# Patient Record
Sex: MALE | Race: WHITE | NOT HISPANIC OR LATINO | URBAN - NONMETROPOLITAN AREA
[De-identification: names, ages, dates, MRNs, and addresses within clinical notes are randomized per-mention and may not be internally consistent; named-entity substitution may affect disease eponyms.]

---

## 2022-04-25 RX ORDER — SERTRALINE HYDROCHLORIDE 100 MG/1
TABLET, FILM COATED ORAL
Qty: 90 TABLET | Refills: 0 | Status: SHIPPED | OUTPATIENT
Start: 2022-04-25 | End: 2022-05-25 | Stop reason: SDUPTHER

## 2022-04-25 NOTE — TELEPHONE ENCOUNTER
Rx Refill Note    Requested Prescriptions     Pending Prescriptions Disp Refills   • sertraline (ZOLOFT) 100 MG tablet [Pharmacy Med Name: SERTRALINE  MG TABLET] 90 tablet 0     Sig: TAKE ONE TABLET BY MOUTH DAILY        Last office visit with prescribing clinician: [facility] 10/29/21     Next office visit with prescribing clinician: Visit date not found   Last labs:   Last refill: 10/22/21   Pharmacy (be sure to add in Epic). correct

## 2022-05-12 ENCOUNTER — OFFICE VISIT (OUTPATIENT)
Dept: FAMILY MEDICINE CLINIC | Facility: CLINIC | Age: 52
End: 2022-05-12

## 2022-05-12 VITALS
OXYGEN SATURATION: 99 % | HEIGHT: 71 IN | RESPIRATION RATE: 15 BRPM | SYSTOLIC BLOOD PRESSURE: 118 MMHG | BODY MASS INDEX: 25.1 KG/M2 | WEIGHT: 179.3 LBS | DIASTOLIC BLOOD PRESSURE: 78 MMHG | TEMPERATURE: 97.8 F | HEART RATE: 78 BPM

## 2022-05-12 DIAGNOSIS — Z13.220 SCREENING FOR HYPERLIPIDEMIA: ICD-10-CM

## 2022-05-12 DIAGNOSIS — E78.00 PURE HYPERCHOLESTEROLEMIA: ICD-10-CM

## 2022-05-12 DIAGNOSIS — Z11.59 NEED FOR HEPATITIS C SCREENING TEST: ICD-10-CM

## 2022-05-12 DIAGNOSIS — Z13.1 SCREENING FOR DIABETES MELLITUS: ICD-10-CM

## 2022-05-12 DIAGNOSIS — I10 ESSENTIAL HYPERTENSION: ICD-10-CM

## 2022-05-12 DIAGNOSIS — R23.0 BLUISH SKIN DISCOLORATION: Primary | ICD-10-CM

## 2022-05-12 DIAGNOSIS — Z79.899 HIGH RISK MEDICATION USE: ICD-10-CM

## 2022-05-12 PROBLEM — F41.9 ANXIETY: Status: ACTIVE | Noted: 2022-05-12

## 2022-05-12 PROBLEM — E66.3 OVERWEIGHT WITH BODY MASS INDEX (BMI) 25.0-29.9: Status: ACTIVE | Noted: 2019-08-27

## 2022-05-12 PROBLEM — K21.9 GERD WITHOUT ESOPHAGITIS: Status: ACTIVE | Noted: 2022-05-12

## 2022-05-12 PROCEDURE — 99204 OFFICE O/P NEW MOD 45 MIN: CPT | Performed by: PHYSICIAN ASSISTANT

## 2022-05-12 RX ORDER — TESTOSTERONE CYPIONATE 200 MG/ML
INJECTION, SOLUTION INTRAMUSCULAR
COMMUNITY

## 2022-05-12 RX ORDER — RAMIPRIL 10 MG/1
CAPSULE ORAL
COMMUNITY
End: 2022-05-31

## 2022-05-12 RX ORDER — OMEPRAZOLE 40 MG/1
CAPSULE, DELAYED RELEASE ORAL
COMMUNITY
Start: 2022-02-24

## 2022-05-12 NOTE — ASSESSMENT & PLAN NOTE
Hypertension is unchanged.  Continue current treatment regimen.  Blood pressure will be reassessed in 2 weeks.

## 2022-05-12 NOTE — PROGRESS NOTES
Patient Office Visit      Patient Name: Harvey Wu  : 1970   MRN: [id number]     Chief Complaint:    Chief Complaint   Patient presents with   • Skin Discoloration     Left knee x 5 days.       History of Present Illness: Harvey Wu is a 51 y.o. male who is here today with concerns of a purplish discoloration all around the left anterior knee area.  The discoloration was much worse this morning when he woke up.  He does not know if he has a bruise.  He cannot recall any type of injury.  Area does not hurt at all.  He admits to drinking about 3 beers a night.  He denies any history of liver enzyme elevation.    Subjective      Review of Systems:   Review of Systems   Constitutional: Negative for chills and fever.   Gastrointestinal: Negative for abdominal pain.   Musculoskeletal: Negative for arthralgias, joint swelling and myalgias.   Skin: Positive for color change. Negative for rash.        Past Medical History:   Past Medical History:   Diagnosis Date   • Boxer's fracture     right hand - did not heal right with casting so had surgery to repair   • Generalized anxiety disorder    • Low testosterone     testosterone shots       Past Surgical History:   Past Surgical History:   Procedure Laterality Date   • HAND SURGERY      Boxer's fracture repair   • INGUINAL HERNIA REPAIR Right 1972       Family History:   Family History   Problem Relation Age of Onset   • Pneumonia Father         cause of death   • Other Father         heavy smoker   • Cancer Brother 50        throat cancer       Social History:   Social History     Socioeconomic History   • Marital status:    Tobacco Use   • Smoking status: Current Some Day Smoker     Packs/day: 0.25     Years: 10.00     Pack years: 2.50   • Smokeless tobacco: Never Used   Vaping Use   • Vaping Use: Never used   Substance and Sexual Activity   • Alcohol use: Defer   • Drug use: Defer   • Sexual activity: Defer       Allergies:   Allergies  "  Allergen Reactions   • Codeine Itching       Objective     Physical Exam:  Vital Signs:   Vitals:    05/12/22 1436   BP: 118/78   BP Location: Right arm   Patient Position: Sitting   Cuff Size: Adult   Pulse: 78   Resp: 15   Temp: 97.8 °F (36.6 °C)   TempSrc: Temporal   SpO2: 99%   Weight: 81.3 kg (179 lb 4.8 oz)   Height: 180.3 cm (71\")     Body mass index is 25.01 kg/m².          Physical Exam  Skin:            Comments: Patient's overall complexion is kendal and dark.  There is a bluish purplish discoloration over the entire anterior left knee area.         Procedures    Assessment / Plan      Assessment/Plan:   Diagnoses and all orders for this visit:    1. Bluish skin discoloration (Primary)  -     ARLEEN Direct Reflex to 11 Biomarker; Future  -     C-reactive protein; Future    2. Pure hypercholesterolemia  -     Lipid Panel; Future    3. Essential hypertension  Assessment & Plan:  Hypertension is unchanged.  Continue current treatment regimen.  Blood pressure will be reassessed in 2 weeks.      4. High risk medication use  -     Cancel: CK; Future  -     Cancel: CBC & Differential; Future  -     Cancel: Comprehensive Metabolic Panel; Future  -     Cancel: ARLEEN Direct Reflex to 11 Biomarker  -     Cancel: C-reactive protein  -     CK; Future  -     CBC & Differential; Future  -     Comprehensive Metabolic Panel; Future    5. Need for hepatitis C screening test  -     Cancel: Hepatitis C Antibody  -     Hepatitis C Antibody; Future    6. Screening for hyperlipidemia  -     Cancel: Lipid Panel; Future  -     Lipid Panel; Future    7. Screening for diabetes mellitus  -     Cancel: Hemoglobin A1c; Future  -     Hemoglobin A1c; Future     Patient was also examined by Dr. Bran.  He was unsure of the cause of this bluish purplish skin discoloration around the patient's left knee area.  This is not consistent with a bruise as the area is not tender and has not changed colors over the past 5 days.  Patient has a dark " complexion in general and has some reddish discolored areas around his anterior chest but this appears to be something different.  He has a history of melasma on his face but this has resolved.  Dr. Bran did not think that the discoloration around his knee is melasma.  Patient does have a history of low testosterone treated with testosterone replacement managed by urology.  Urology monitors his testosterone levels and his PSA levels.  Patient is going to return for fasting blood work in the next few days and follow-up with Dr. Bran in 2 weeks.    Medications:     Current Outpatient Medications:   •  omeprazole (priLOSEC) 40 MG capsule, , Disp: , Rfl:   •  ramipril (ALTACE) 10 MG capsule, ramipril 10 mg capsule  Take 1 capsule every day by oral route., Disp: , Rfl:   •  sertraline (ZOLOFT) 100 MG tablet, TAKE ONE TABLET BY MOUTH DAILY, Disp: 90 tablet, Rfl: 0  •  Testosterone Cypionate (DEPOTESTOTERONE CYPIONATE) 200 MG/ML injection, Depo-Testosterone 200 mg/mL intramuscular oil  1 cc ( 200 mg) IM Every week, Disp: , Rfl:         Follow Up:   Return in about 2 weeks (around 5/26/2022) for Recheck.    Arlin Moon PA-C   [facility] [place] [facility]

## 2022-05-18 ENCOUNTER — LAB (OUTPATIENT)
Dept: FAMILY MEDICINE CLINIC | Facility: CLINIC | Age: 52
End: 2022-05-18

## 2022-05-18 DIAGNOSIS — R23.0 BLUISH SKIN DISCOLORATION: ICD-10-CM

## 2022-05-18 DIAGNOSIS — E78.00 PURE HYPERCHOLESTEROLEMIA: ICD-10-CM

## 2022-05-18 DIAGNOSIS — Z13.1 SCREENING FOR DIABETES MELLITUS: ICD-10-CM

## 2022-05-18 DIAGNOSIS — Z11.59 NEED FOR HEPATITIS C SCREENING TEST: ICD-10-CM

## 2022-05-18 DIAGNOSIS — Z79.899 HIGH RISK MEDICATION USE: ICD-10-CM

## 2022-05-18 DIAGNOSIS — Z13.220 SCREENING FOR HYPERLIPIDEMIA: ICD-10-CM

## 2022-05-18 PROCEDURE — 36415 COLL VENOUS BLD VENIPUNCTURE: CPT | Performed by: PHYSICIAN ASSISTANT

## 2022-05-19 LAB
ALBUMIN SERPL-MCNC: 4.9 G/DL (ref 3.8–4.9)
ALBUMIN/GLOB SERPL: 2 {RATIO} (ref 1.2–2.2)
ALP SERPL-CCNC: 47 IU/L (ref 44–121)
ALT SERPL-CCNC: 16 IU/L (ref 0–44)
ANA SER QL: NEGATIVE
AST SERPL-CCNC: 33 IU/L (ref 0–40)
BASOPHILS # BLD AUTO: 0.1 X10E3/UL (ref 0–0.2)
BASOPHILS NFR BLD AUTO: 2 %
BILIRUB SERPL-MCNC: 0.5 MG/DL (ref 0–1.2)
BUN SERPL-MCNC: 7 MG/DL (ref 6–24)
BUN/CREAT SERPL: 7 (ref 9–20)
CALCIUM SERPL-MCNC: 9.5 MG/DL (ref 8.7–10.2)
CHLORIDE SERPL-SCNC: 100 MMOL/L (ref 96–106)
CHOLEST SERPL-MCNC: 242 MG/DL (ref 100–199)
CK SERPL-CCNC: 233 U/L (ref 41–331)
CO2 SERPL-SCNC: 20 MMOL/L (ref 20–29)
CREAT SERPL-MCNC: 0.99 MG/DL (ref 0.76–1.27)
CRP SERPL-MCNC: <1 MG/L (ref 0–10)
EGFRCR SERPLBLD CKD-EPI 2021: 91 ML/MIN/1.73
EOSINOPHIL # BLD AUTO: 0 X10E3/UL (ref 0–0.4)
EOSINOPHIL NFR BLD AUTO: 1 %
ERYTHROCYTE [DISTWIDTH] IN BLOOD BY AUTOMATED COUNT: 13 % (ref 11.6–15.4)
GLOBULIN SER CALC-MCNC: 2.5 G/DL (ref 1.5–4.5)
GLUCOSE SERPL-MCNC: 86 MG/DL (ref 65–99)
HBA1C MFR BLD: 5.2 % (ref 4.8–5.6)
HCT VFR BLD AUTO: 52.1 % (ref 37.5–51)
HCV AB S/CO SERPL IA: <0.1 S/CO RATIO (ref 0–0.9)
HDLC SERPL-MCNC: 95 MG/DL
HGB BLD-MCNC: 17.9 G/DL (ref 13–17.7)
IMM GRANULOCYTES # BLD AUTO: 0 X10E3/UL (ref 0–0.1)
IMM GRANULOCYTES NFR BLD AUTO: 1 %
LDLC SERPL CALC-MCNC: 123 MG/DL (ref 0–99)
LYMPHOCYTES # BLD AUTO: 1.3 X10E3/UL (ref 0.7–3.1)
LYMPHOCYTES NFR BLD AUTO: 22 %
MCH RBC QN AUTO: 32.3 PG (ref 26.6–33)
MCHC RBC AUTO-ENTMCNC: 34.4 G/DL (ref 31.5–35.7)
MCV RBC AUTO: 94 FL (ref 79–97)
MONOCYTES # BLD AUTO: 0.8 X10E3/UL (ref 0.1–0.9)
MONOCYTES NFR BLD AUTO: 12 %
NEUTROPHILS # BLD AUTO: 4 X10E3/UL (ref 1.4–7)
NEUTROPHILS NFR BLD AUTO: 62 %
PLATELET # BLD AUTO: 311 X10E3/UL (ref 150–450)
POTASSIUM SERPL-SCNC: 4.7 MMOL/L (ref 3.5–5.2)
PROT SERPL-MCNC: 7.4 G/DL (ref 6–8.5)
RBC # BLD AUTO: 5.55 X10E6/UL (ref 4.14–5.8)
SODIUM SERPL-SCNC: 139 MMOL/L (ref 134–144)
TRIGL SERPL-MCNC: 143 MG/DL (ref 0–149)
VLDLC SERPL CALC-MCNC: 24 MG/DL (ref 5–40)
WBC # BLD AUTO: 6.2 X10E3/UL (ref 3.4–10.8)

## 2022-05-25 ENCOUNTER — OFFICE VISIT (OUTPATIENT)
Dept: FAMILY MEDICINE CLINIC | Facility: CLINIC | Age: 52
End: 2022-05-25

## 2022-05-25 VITALS
DIASTOLIC BLOOD PRESSURE: 89 MMHG | OXYGEN SATURATION: 99 % | BODY MASS INDEX: 25.07 KG/M2 | RESPIRATION RATE: 12 BRPM | HEART RATE: 77 BPM | TEMPERATURE: 98 F | HEIGHT: 71 IN | WEIGHT: 179.1 LBS | SYSTOLIC BLOOD PRESSURE: 140 MMHG

## 2022-05-25 DIAGNOSIS — Z12.11 SCREENING FOR COLON CANCER: ICD-10-CM

## 2022-05-25 DIAGNOSIS — D75.1 ERYTHROCYTOSIS: ICD-10-CM

## 2022-05-25 DIAGNOSIS — R79.89 LOW TESTOSTERONE: ICD-10-CM

## 2022-05-25 DIAGNOSIS — Z79.899 HIGH RISK MEDICATION USE: ICD-10-CM

## 2022-05-25 DIAGNOSIS — F41.9 ANXIETY: ICD-10-CM

## 2022-05-25 DIAGNOSIS — I10 ESSENTIAL HYPERTENSION: ICD-10-CM

## 2022-05-25 DIAGNOSIS — Z00.00 PHYSICAL EXAM: Primary | ICD-10-CM

## 2022-05-25 PROCEDURE — 90471 IMMUNIZATION ADMIN: CPT | Performed by: FAMILY MEDICINE

## 2022-05-25 PROCEDURE — 99396 PREV VISIT EST AGE 40-64: CPT | Performed by: FAMILY MEDICINE

## 2022-05-25 PROCEDURE — 90715 TDAP VACCINE 7 YRS/> IM: CPT | Performed by: FAMILY MEDICINE

## 2022-05-25 RX ORDER — SERTRALINE HYDROCHLORIDE 100 MG/1
TABLET, FILM COATED ORAL
Qty: 135 TABLET | Refills: 0 | Status: SHIPPED | OUTPATIENT
Start: 2022-05-25 | End: 2022-10-05

## 2022-05-25 NOTE — ASSESSMENT & PLAN NOTE
And under a lot of duress at work.  We will increase his sertraline to 150 mg p.o. daily.  Have him come back in 3 months for recheck.

## 2022-05-25 NOTE — PROGRESS NOTES
Patient Name: Harvey Wu  : 1970   MRN: [id number]     Chief Complaint:    Chief Complaint   Patient presents with   • Annual Exam     Pt here for yearly exam   • Anxiety   • Hypertension       History of Present Illness: Harvey Wu is a 51 y.o. male who is here today for their annual health maintenance and physical.           Review of Systems:   Review of Systems    Past Medical History, Social History, Family History and Care Team were all reviewed with patient and updated as appropriate.     Medications:     Current Outpatient Medications:   •  omeprazole (priLOSEC) 40 MG capsule, , Disp: , Rfl:   •  ramipril (ALTACE) 10 MG capsule, ramipril 10 mg capsule  Take 1 capsule every day by oral route., Disp: , Rfl:   •  sertraline (ZOLOFT) 100 MG tablet, Take 1 and 1/2 tablets once a day, Disp: 135 tablet, Rfl: 0  •  Testosterone Cypionate (DEPOTESTOTERONE CYPIONATE) 200 MG/ML injection, Depo-Testosterone 200 mg/mL intramuscular oil  1 cc ( 200 mg) IM Every week, Disp: , Rfl:     Allergies:   Allergies   Allergen Reactions   • Codeine Itching       Health Maintenance   Topic Date Due   • ZOSTER VACCINE (1 of 2) Never done   • COVID-19 Vaccine (3 - Booster for Moderna series) 2021   • Pneumococcal Vaccine 0-64 (1 - PCV) 2023 (Originally 1976)   • INFLUENZA VACCINE  2022   • LIPID PANEL  2023   • ANNUAL PHYSICAL  2023   • COLORECTAL CANCER SCREENING  2029   • TDAP/TD VACCINES (2 - Td or Tdap) 2032   • HEPATITIS C SCREENING  Completed        PHQ-2 Total Score:          Intimate partner violence: (Screen on initial visit, older adult with injury or evidence of neglect):  • Violence can be a problem in many people's lives, so I now ask every patient about trauma or abuse they may have experienced in a relationship.  • Stress/Safety - Do you feel safe in your relationship?  • Afraid/Abused - Have you ever been in a relationship where you were  "threatened, hurt, or afraid?  • Friend/Family - Are your friends aware you have been hurt?  • Emergency Plan - Do you have a safe place to go and the resources you need in an emergency?    Osteoporosis:   • Men: history of low trauma fracture, androgen deprivation therapy for prostate cancer, hypogonadism, primary hyperparathyroidism, intestinal disorders.       Physical Exam:  Vital Signs:   Vitals:    05/25/22 0845   BP: 140/89   BP Location: Left arm   Patient Position: Sitting   Cuff Size: Adult   Pulse: 77   Resp: 12   Temp: 98 °F (36.7 °C)   SpO2: 99%   Weight: 81.2 kg (179 lb 1.6 oz)   Height: 180.3 cm (71\")   PainSc: 0-No pain     Body mass index is 24.98 kg/m².     Physical Exam    Procedures      Assessment/Plan:   Diagnoses and all orders for this visit:    1. Physical exam (Primary)  Assessment & Plan:  Discussed Cologuard.      2. Screening for colon cancer  Assessment & Plan:  Discussed Cologuard.    Orders:  -     Cologuard - Stool, Per Rectum; Future    3. Essential hypertension  Assessment & Plan:  Discussed with patient to monitor their blood pressure and if systolic blood pressure goes above 140 or diastolic is above 90 to return to clinic.  Take medicines as directed, call for any problems, patient not having or any worrisome symptoms.          4. High risk medication use  Assessment & Plan:  Patient is on testosterone.  We will recheck CBC in 3 months.      5. Anxiety  Assessment & Plan:  And under a lot of duress at work.  We will increase his sertraline to 150 mg p.o. daily.  Have him come back in 3 months for recheck.      6. Erythrocytosis  Assessment & Plan:  Hemoglobin hematocrit are elevated.  I think it might be a side effect of testosterone.  We will recheck in 3 months.  If it continues to elevate, may him talk to the urologist.      7. Low testosterone  Assessment & Plan:  Patient sees urology.      Other orders  -     Tdap Vaccine Greater Than or Equal To 8yo IM  -     sertraline " (ZOLOFT) 100 MG tablet; Take 1 and 1/2 tablets once a day  Dispense: 135 tablet; Refill: 0             Follow Up:   No follow-ups on file.    Healthcare Maintenance:   Counseling provided on stress management.  Harvey Wu voices understanding and acceptance of this advice and will call back with any further questions or concerns. AVS with preventive healthcare tips printed for patient.     Masood Bran MD  [facility] [place] [facility] [place]

## 2022-05-25 NOTE — ASSESSMENT & PLAN NOTE
Hemoglobin hematocrit are elevated.  I think it might be a side effect of testosterone.  We will recheck in 3 months.  If it continues to elevate, may him talk to the urologist.

## 2022-05-31 RX ORDER — RAMIPRIL 10 MG/1
CAPSULE ORAL
Qty: 90 CAPSULE | Refills: 0 | Status: SHIPPED | OUTPATIENT
Start: 2022-05-31 | End: 2022-08-29

## 2022-05-31 NOTE — TELEPHONE ENCOUNTER
Rx Refill Note    Requested Prescriptions     Pending Prescriptions Disp Refills   • ramipril (ALTACE) 10 MG capsule [Pharmacy Med Name: RAMIPRIL 10 MG CAPSULE] 90 capsule      Sig: TAKE ONE CAPSULE BY MOUTH DAILY        Last office visit with prescribing clinician: 5/25/2022      Next office visit with prescribing clinician: 8/25/2022   Last labs: 10/22/2021  Last refill: 02/24/2022

## 2022-08-29 RX ORDER — RAMIPRIL 10 MG/1
CAPSULE ORAL
Qty: 30 CAPSULE | Refills: 0 | Status: SHIPPED | OUTPATIENT
Start: 2022-08-29 | End: 2022-09-27

## 2022-08-29 NOTE — TELEPHONE ENCOUNTER
Rx Refill Note    Requested Prescriptions     Pending Prescriptions Disp Refills   • ramipril (ALTACE) 10 MG capsule [Pharmacy Med Name: RAMIPRIL 10 MG CAPSULE] 30 capsule 0     Sig: TAKE ONE CAPSULE BY MOUTH DAILY        Last office visit with prescribing clinician: 5/25/2022      Next office visit with prescribing clinician: Visit date not found   Last labs:   Last refill: 05/31/2022   Pharmacy (be sure to add in Epic). correct              
two [facility]/Self

## 2022-09-27 RX ORDER — RAMIPRIL 10 MG/1
CAPSULE ORAL
Qty: 90 CAPSULE | Refills: 0 | Status: SHIPPED | OUTPATIENT
Start: 2022-09-27 | End: 2023-01-06

## 2022-09-27 NOTE — TELEPHONE ENCOUNTER
Rx Refill Note    Requested Prescriptions     Pending Prescriptions Disp Refills   • ramipril (ALTACE) 10 MG capsule [Pharmacy Med Name: RAMIPRIL 10 MG CAPSULE] 30 capsule 0     Sig: TAKE ONE CAPSULE BY MOUTH DAILY        Last office visit with prescribing clinician: 5/25/2022      Next office visit with prescribing clinician: none  Last labs: 05/25/2022  Last refill:  08/29/2022  Pharmacy  [facility]

## 2022-10-05 RX ORDER — SERTRALINE HYDROCHLORIDE 100 MG/1
TABLET, FILM COATED ORAL
Qty: 45 TABLET | Refills: 0 | Status: SHIPPED | OUTPATIENT
Start: 2022-10-05 | End: 2022-11-14 | Stop reason: SDUPTHER

## 2022-10-05 NOTE — TELEPHONE ENCOUNTER
Rx Refill Note    Requested Prescriptions     Pending Prescriptions Disp Refills   • sertraline (ZOLOFT) 100 MG tablet [Pharmacy Med Name: SERTRALINE  MG TABLET] 135 tablet 0     Sig: TAKE 1 AND 1/2 TABLET BY MOUTH DAILY        Last office visit with prescribing clinician: 5/25/2022      Next office visit with prescribing clinician: none  Last labs: 05/25/2022  Last refill:  05/25/2022  Pharmacy  [facility]

## 2022-11-14 RX ORDER — SERTRALINE HYDROCHLORIDE 100 MG/1
TABLET, FILM COATED ORAL
Qty: 135 TABLET | Refills: 1 | Status: SHIPPED | OUTPATIENT
Start: 2022-11-14

## 2023-01-04 NOTE — TELEPHONE ENCOUNTER
Rx Refill Note    Requested Prescriptions     Pending Prescriptions Disp Refills   • ramipril (ALTACE) 10 MG capsule [Pharmacy Med Name: RAMIPRIL 10 MG CAPSULE] 90 capsule 0     Sig: TAKE ONE CAPSULE BY MOUTH DAILY        Last office visit with prescribing clinician: 5/25/2022      Next office visit with prescribing clinician: Visit date not found   Last labs:   Last refill: 09/27/2022   Pharmacy (be sure to add in Epic). correct

## 2023-01-06 RX ORDER — RAMIPRIL 10 MG/1
CAPSULE ORAL
Qty: 90 CAPSULE | Refills: 0 | Status: SHIPPED | OUTPATIENT
Start: 2023-01-06

## 2023-04-17 RX ORDER — NEEDLES, DISPOSABLE 25GX5/8"
NEEDLE, DISPOSABLE MISCELLANEOUS
COMMUNITY
Start: 2023-03-10

## 2023-04-17 RX ORDER — RAMIPRIL 10 MG/1
CAPSULE ORAL
Qty: 30 CAPSULE | Refills: 0 | Status: SHIPPED | OUTPATIENT
Start: 2023-04-17

## 2023-05-16 RX ORDER — RAMIPRIL 10 MG/1
CAPSULE ORAL
Qty: 30 CAPSULE | Refills: 0 | Status: SHIPPED | OUTPATIENT
Start: 2023-05-16

## 2023-05-16 NOTE — TELEPHONE ENCOUNTER
Rx Refill Note    Requested Prescriptions     Pending Prescriptions Disp Refills   • ramipril (ALTACE) 10 MG capsule [Pharmacy Med Name: RAMIPRIL 10 MG CAPSULE] 30 capsule 0     Sig: TAKE ONE CAPSULE BY MOUTH DAILY        Last office visit with prescribing clinician: 5/25/2022      Next office visit with prescribing clinician: Visit date not found   Last labs:   Last refill: 04/17/2023   Pharmacy (be sure to add in Epic). correct

## 2023-05-25 RX ORDER — SERTRALINE HYDROCHLORIDE 100 MG/1
TABLET, FILM COATED ORAL
Qty: 45 TABLET | Refills: 0 | Status: SHIPPED | OUTPATIENT
Start: 2023-05-25

## 2023-05-25 NOTE — TELEPHONE ENCOUNTER
Rx Refill Note    Requested Prescriptions     Pending Prescriptions Disp Refills   • sertraline (ZOLOFT) 100 MG tablet [Pharmacy Med Name: SERTRALINE  MG TABLET] 135 tablet 0     Sig: TAKE 1 AND 1/2 TABLET BY MOUTH DAILY        Last office visit with prescribing clinician: 5/25/2022      Next office visit with prescribing clinician: Visit date not found   Last labs:   Last refill: 11/14/2022   Pharmacy (be sure to add in Epic). correct

## 2023-06-15 RX ORDER — RAMIPRIL 10 MG/1
CAPSULE ORAL
Qty: 30 CAPSULE | Refills: 0 | OUTPATIENT
Start: 2023-06-15

## 2023-06-16 RX ORDER — OMEPRAZOLE 40 MG/1
CAPSULE, DELAYED RELEASE ORAL
Qty: 180 CAPSULE | OUTPATIENT
Start: 2023-06-16

## 2024-01-03 ENCOUNTER — OFFICE VISIT (OUTPATIENT)
Dept: FAMILY MEDICINE CLINIC | Facility: CLINIC | Age: 54
End: 2024-01-03
Payer: COMMERCIAL

## 2024-01-03 VITALS
HEART RATE: 68 BPM | RESPIRATION RATE: 16 BRPM | DIASTOLIC BLOOD PRESSURE: 78 MMHG | WEIGHT: 176.1 LBS | OXYGEN SATURATION: 98 % | HEIGHT: 71 IN | SYSTOLIC BLOOD PRESSURE: 128 MMHG | BODY MASS INDEX: 24.65 KG/M2

## 2024-01-03 DIAGNOSIS — F41.1 GENERALIZED ANXIETY DISORDER: ICD-10-CM

## 2024-01-03 DIAGNOSIS — R79.89 LOW TESTOSTERONE: ICD-10-CM

## 2024-01-03 DIAGNOSIS — Z23 NEED FOR VIRAL IMMUNIZATION: ICD-10-CM

## 2024-01-03 DIAGNOSIS — E03.9 ACQUIRED HYPOTHYROIDISM: ICD-10-CM

## 2024-01-03 DIAGNOSIS — I10 ESSENTIAL HYPERTENSION: Primary | ICD-10-CM

## 2024-01-03 DIAGNOSIS — Z12.5 SCREENING FOR PROSTATE CANCER: ICD-10-CM

## 2024-01-03 DIAGNOSIS — D75.1 ERYTHROCYTOSIS: ICD-10-CM

## 2024-01-03 PROCEDURE — 99214 OFFICE O/P EST MOD 30 MIN: CPT | Performed by: FAMILY MEDICINE

## 2024-01-03 NOTE — PROGRESS NOTES
Patient Name: Harvey Wu  : 1970   MRN: [id number]     Chief Complaint:    Chief Complaint   Patient presents with    Annual Exam       History of Present Illness: Harvey Wu is a 53 y.o. male who is here today for follow up.  HPI        Review of Systems:   Review of Systems   Constitutional: Negative.    HENT: Negative.     Eyes: Negative.    Respiratory: Negative.     Cardiovascular: Negative.    Gastrointestinal: Negative.    Neurological: Negative.         Past Medical History:   Past Medical History:   Diagnosis Date    Boxer's fracture     right hand - did not heal right with casting so had surgery to repair    Generalized anxiety disorder     Hypertension     Low testosterone     testosterone shots       Past Surgical History:   Past Surgical History:   Procedure Laterality Date    FRACTURE SURGERY      HAND SURGERY      Boxer's fracture repair    INGUINAL HERNIA REPAIR Right 1972       Family History:   Family History   Problem Relation Age of Onset    Pneumonia Father         cause of death    Other Father         heavy smoker    Cancer Brother 50        throat cancer       Social History:   Social History     Socioeconomic History    Marital status:    Tobacco Use    Smoking status: Every Day     Packs/day: 0.50     Years: 20.00     Additional pack years: 0.00     Total pack years: 10.00     Types: Cigarettes     Start date: 1986     Last attempt to quit: 2020     Years since quittin.0     Passive exposure: Current    Smokeless tobacco: Never   Vaping Use    Vaping Use: Never used   Substance and Sexual Activity    Alcohol use: Yes     Alcohol/week: 21.0 standard drinks of alcohol     Types: 21 Cans of beer per week    Drug use: Never    Sexual activity: Yes     Partners: Female       Medications:     Current Outpatient Medications:     omeprazole (priLOSEC) 40 MG capsule, Take 1 capsule by mouth Daily., Disp: 90 capsule, Rfl: 1    ramipril (ALTACE) 10 MG  "capsule, Take 1 capsule by mouth Daily., Disp: 90 capsule, Rfl: 1    sertraline (ZOLOFT) 100 MG tablet, Take 1.5 tablets by mouth Daily., Disp: 135 tablet, Rfl: 1    Testosterone Cypionate (DEPOTESTOTERONE CYPIONATE) 200 MG/ML injection, Depo-Testosterone 200 mg/mL intramuscular oil  1 cc ( 200 mg) IM Every week, Disp: , Rfl:     BD Hypodermic Needle 21G X 2\" misc, USE WEEKLY TO INJECT INSULIN, Disp: , Rfl:     Allergies:   Allergies   Allergen Reactions    Codeine Itching         Physical Exam:  Vital Signs:   Vitals:    01/03/24 1422   BP: 128/78   BP Location: Left arm   Patient Position: Sitting   Cuff Size: Adult   Pulse: 68   Resp: 16   SpO2: 98%   Weight: 79.9 kg (176 lb 1.6 oz)   Height: 180.3 cm (70.98\")   PainSc: 0-No pain     Body mass index is 24.57 kg/m².     Physical Exam  Vitals and nursing note reviewed.   Constitutional:       Appearance: Normal appearance. He is normal weight.   HENT:      Head: Normocephalic and atraumatic.      Right Ear: Tympanic membrane, ear canal and external ear normal.      Left Ear: Tympanic membrane, ear canal and external ear normal.      Nose: Nose normal.      Mouth/Throat:      Mouth: Mucous membranes are dry.      Pharynx: Oropharynx is clear.   Eyes:      Extraocular Movements: Extraocular movements intact.      Conjunctiva/sclera: Conjunctivae normal.      Pupils: Pupils are equal, round, and reactive to light.   Cardiovascular:      Rate and Rhythm: Normal rate and regular rhythm.      Pulses: Normal pulses.      Heart sounds: Normal heart sounds.   Pulmonary:      Effort: Pulmonary effort is normal.      Breath sounds: Normal breath sounds.   Musculoskeletal:      Cervical back: Normal range of motion and neck supple.   Feet:      Comments:      Neurological:      Mental Status: He is alert.         Procedures      Assessment/Plan:   Diagnoses and all orders for this visit:    1. Essential hypertension (Primary)  Assessment & Plan:  Discussed with patient to " monitor their blood pressure and if systolic blood pressure goes above 140 or diastolic is above 90 to return to clinic.  Take medicines as directed, call for any problems, patient not having or any worrisome symptoms.        Orders:  -     CBC & Differential; Future  -     Lipid Panel; Future  -     Comprehensive Metabolic Panel; Future  -     TSH Rfx On Abnormal To Free T4; Future  -     PSA Screen; Future    2. Acquired hypothyroidism  Assessment & Plan:  Blood work    Orders:  -     CBC & Differential; Future  -     Lipid Panel; Future  -     Comprehensive Metabolic Panel; Future  -     TSH Rfx On Abnormal To Free T4; Future  -     PSA Screen; Future    3. Generalized anxiety disorder  Assessment & Plan:  Continue medications.    Orders:  -     CBC & Differential; Future  -     Lipid Panel; Future  -     Comprehensive Metabolic Panel; Future  -     TSH Rfx On Abnormal To Free T4; Future  -     PSA Screen; Future    4. Erythrocytosis  Assessment & Plan:  Recheck Blood work today.    Orders:  -     CBC & Differential; Future  -     Lipid Panel; Future  -     Comprehensive Metabolic Panel; Future  -     TSH Rfx On Abnormal To Free T4; Future  -     PSA Screen; Future    5. Low testosterone  Assessment & Plan:  Patient has been seen Dr. Jc.    Orders:  -     CBC & Differential; Future  -     Lipid Panel; Future  -     Comprehensive Metabolic Panel; Future  -     TSH Rfx On Abnormal To Free T4; Future  -     PSA Screen; Future    6. Screening for prostate cancer  -     CBC & Differential; Future  -     Lipid Panel; Future  -     Comprehensive Metabolic Panel; Future  -     TSH Rfx On Abnormal To Free T4; Future  -     PSA Screen; Future             Follow Up:   Return in about 6 months (around 7/3/2024) for Annual physical, Bloodwork 1 week prior to next appointment.    Masood Bran MD  [facility]     Answers submitted by the patient for this visit:  Primary Reason for Visit  (Submitted on 1/3/2024)  What is the primary reason for your visit?: Other  Other (Submitted on 1/3/2024)  Please describe your symptoms.: Check up  Have you had these symptoms before?: No  How long have you been having these symptoms?: 1-4 days

## 2024-01-04 ENCOUNTER — TELEPHONE (OUTPATIENT)
Dept: FAMILY MEDICINE CLINIC | Facility: CLINIC | Age: 54
End: 2024-01-04
Payer: COMMERCIAL

## 2024-01-04 LAB
ALBUMIN SERPL-MCNC: 4.6 G/DL (ref 3.8–4.9)
ALBUMIN/GLOB SERPL: 1.9 {RATIO} (ref 1.2–2.2)
ALP SERPL-CCNC: 56 IU/L (ref 44–121)
ALT SERPL-CCNC: 18 IU/L (ref 0–44)
AST SERPL-CCNC: 32 IU/L (ref 0–40)
BASOPHILS # BLD AUTO: 0.1 X10E3/UL (ref 0–0.2)
BASOPHILS NFR BLD AUTO: 1 %
BILIRUB SERPL-MCNC: 0.7 MG/DL (ref 0–1.2)
BUN SERPL-MCNC: 7 MG/DL (ref 6–24)
BUN/CREAT SERPL: 6 (ref 9–20)
CALCIUM SERPL-MCNC: 9.6 MG/DL (ref 8.7–10.2)
CHLORIDE SERPL-SCNC: 102 MMOL/L (ref 96–106)
CHOLEST SERPL-MCNC: 206 MG/DL (ref 100–199)
CO2 SERPL-SCNC: 22 MMOL/L (ref 20–29)
CREAT SERPL-MCNC: 1.09 MG/DL (ref 0.76–1.27)
EGFRCR SERPLBLD CKD-EPI 2021: 80 ML/MIN/1.73
EOSINOPHIL # BLD AUTO: 0.1 X10E3/UL (ref 0–0.4)
EOSINOPHIL NFR BLD AUTO: 1 %
ERYTHROCYTE [DISTWIDTH] IN BLOOD BY AUTOMATED COUNT: 12 % (ref 11.6–15.4)
GLOBULIN SER CALC-MCNC: 2.4 G/DL (ref 1.5–4.5)
GLUCOSE SERPL-MCNC: 98 MG/DL (ref 70–99)
HCT VFR BLD AUTO: 51.2 % (ref 37.5–51)
HDLC SERPL-MCNC: 109 MG/DL
HGB BLD-MCNC: 17.8 G/DL (ref 13–17.7)
IMM GRANULOCYTES # BLD AUTO: 0 X10E3/UL (ref 0–0.1)
IMM GRANULOCYTES NFR BLD AUTO: 0 %
LDLC SERPL CALC-MCNC: 75 MG/DL (ref 0–99)
LYMPHOCYTES # BLD AUTO: 1.5 X10E3/UL (ref 0.7–3.1)
LYMPHOCYTES NFR BLD AUTO: 19 %
MCH RBC QN AUTO: 32.5 PG (ref 26.6–33)
MCHC RBC AUTO-ENTMCNC: 34.8 G/DL (ref 31.5–35.7)
MCV RBC AUTO: 93 FL (ref 79–97)
MONOCYTES # BLD AUTO: 1.1 X10E3/UL (ref 0.1–0.9)
MONOCYTES NFR BLD AUTO: 14 %
NEUTROPHILS # BLD AUTO: 5.1 X10E3/UL (ref 1.4–7)
NEUTROPHILS NFR BLD AUTO: 65 %
PLATELET # BLD AUTO: 274 X10E3/UL (ref 150–450)
POTASSIUM SERPL-SCNC: 4.5 MMOL/L (ref 3.5–5.2)
PROT SERPL-MCNC: 7 G/DL (ref 6–8.5)
PSA SERPL-MCNC: 1.2 NG/ML (ref 0–4)
RBC # BLD AUTO: 5.48 X10E6/UL (ref 4.14–5.8)
SODIUM SERPL-SCNC: 136 MMOL/L (ref 134–144)
TRIGL SERPL-MCNC: 135 MG/DL (ref 0–149)
TSH SERPL DL<=0.005 MIU/L-ACNC: 1.59 UIU/ML (ref 0.45–4.5)
VLDLC SERPL CALC-MCNC: 22 MG/DL (ref 5–40)
WBC # BLD AUTO: 7.8 X10E3/UL (ref 3.4–10.8)

## 2024-01-04 NOTE — TELEPHONE ENCOUNTER
Please get patient scheduled for a follow up on labs.     Abnormal test, Red blood cell count is elevated,  needs an appointment.

## 2024-01-08 ENCOUNTER — OFFICE VISIT (OUTPATIENT)
Dept: FAMILY MEDICINE CLINIC | Facility: CLINIC | Age: 54
End: 2024-01-08
Payer: COMMERCIAL

## 2024-01-08 VITALS
WEIGHT: 176.3 LBS | HEART RATE: 91 BPM | RESPIRATION RATE: 16 BRPM | DIASTOLIC BLOOD PRESSURE: 84 MMHG | BODY MASS INDEX: 24.68 KG/M2 | HEIGHT: 71 IN | OXYGEN SATURATION: 98 % | SYSTOLIC BLOOD PRESSURE: 130 MMHG

## 2024-01-08 DIAGNOSIS — I10 ESSENTIAL HYPERTENSION: ICD-10-CM

## 2024-01-08 DIAGNOSIS — E23.0 HYPOGONADOTROPIC HYPOGONADISM: ICD-10-CM

## 2024-01-08 DIAGNOSIS — Z87.891 PERSONAL HISTORY OF TOBACCO USE, PRESENTING HAZARDS TO HEALTH: ICD-10-CM

## 2024-01-08 DIAGNOSIS — D75.1 ERYTHROCYTOSIS: Primary | ICD-10-CM

## 2024-01-08 PROCEDURE — 99214 OFFICE O/P EST MOD 30 MIN: CPT | Performed by: FAMILY MEDICINE

## 2024-01-08 RX ORDER — VARENICLINE TARTRATE 0.5 (11)-1
KIT ORAL
Qty: 1 EACH | Refills: 0 | Status: SHIPPED | OUTPATIENT
Start: 2024-01-08 | End: 2024-02-05

## 2024-01-08 RX ORDER — VARENICLINE TARTRATE 1 MG/1
1 TABLET, FILM COATED ORAL 2 TIMES DAILY
Qty: 56 TABLET | Refills: 1 | Status: SHIPPED | OUTPATIENT
Start: 2024-02-05 | End: 2024-04-01

## 2024-01-08 NOTE — PROGRESS NOTES
"       Patient Name: Harvey Wu  : 1970   MRN: [id number]     Chief Complaint:    Chief Complaint   Patient presents with    Follow up Lab Work       History of Present Illness: Harvey Wu is a 53 y.o. male who is here today for follow up on blood count and testosterone replacement  HPI        Review of Systems:   Review of Systems   Constitutional: Negative.    HENT: Negative.     Eyes: Negative.    Respiratory: Negative.     Cardiovascular: Negative.    Gastrointestinal: Negative.    Neurological: Negative.         Past Medical History:   Past Medical History:   Diagnosis Date    Boxer's fracture     right hand - did not heal right with casting so had surgery to repair    Generalized anxiety disorder     Hypertension     Low testosterone     testosterone shots       Past Surgical History:   Past Surgical History:   Procedure Laterality Date    FRACTURE SURGERY      HAND SURGERY      Boxer's fracture repair    INGUINAL HERNIA REPAIR Right 1972       Family History:   Family History   Problem Relation Age of Onset    Pneumonia Father         cause of death    Other Father         heavy smoker    Cancer Brother 50        throat cancer       Social History:   Social History     Socioeconomic History    Marital status:    Tobacco Use    Smoking status: Former     Packs/day: 0.50     Years: 20.00     Additional pack years: 0.00     Total pack years: 10.00     Types: Cigarettes     Start date: 1986     Quit date: 2020     Years since quittin.0     Passive exposure: Current    Smokeless tobacco: Never   Vaping Use    Vaping Use: Never used   Substance and Sexual Activity    Alcohol use: Yes     Alcohol/week: 21.0 standard drinks of alcohol     Types: 21 Cans of beer per week    Drug use: Never    Sexual activity: Yes     Partners: Female       Medications:     Current Outpatient Medications:     BD Hypodermic Needle 21G X 2\" misc, USE WEEKLY TO INJECT INSULIN, Disp: , Rfl:     " "omeprazole (priLOSEC) 40 MG capsule, Take 1 capsule by mouth Daily., Disp: 90 capsule, Rfl: 1    ramipril (ALTACE) 10 MG capsule, Take 1 capsule by mouth Daily., Disp: 90 capsule, Rfl: 1    sertraline (ZOLOFT) 100 MG tablet, Take 1.5 tablets by mouth Daily., Disp: 135 tablet, Rfl: 1    Testosterone Cypionate (DEPOTESTOTERONE CYPIONATE) 200 MG/ML injection, Depo-Testosterone 200 mg/mL intramuscular oil  1 cc ( 200 mg) IM Every week, Disp: , Rfl:     [START ON 2/5/2024] varenicline (Chantix Continuing Month Brett) 1 MG tablet, Take 1 tablet by mouth 2 (Two) Times a Day for 56 days., Disp: 56 tablet, Rfl: 1    Varenicline Tartrate, Starter, 0.5 MG X 11 & 1 MG X 42 tablet therapy pack, Take 0.5 mg by mouth Daily for 3 days, THEN 0.5 mg 2 (Two) Times a Day for 4 days, THEN 1 mg 2 (Two) Times a Day for 21 days. Take 0.5 mg po daily x 3 days, then 0.5 mg po bid x 4 days, then 1 mg po bid, Disp: 1 each, Rfl: 0    Allergies:   Allergies   Allergen Reactions    Codeine Itching         Physical Exam:  Vital Signs:   Vitals:    01/08/24 1111   BP: 130/84   BP Location: Left arm   Patient Position: Sitting   Cuff Size: Adult   Pulse: 91   Resp: 16   SpO2: 98%   Weight: 80 kg (176 lb 4.8 oz)   Height: 180.3 cm (70.98\")   PainSc: 0-No pain     Body mass index is 24.6 kg/m².     Physical Exam  Vitals and nursing note reviewed.   Constitutional:       Appearance: Normal appearance. He is normal weight.   HENT:      Head: Normocephalic and atraumatic.      Right Ear: Tympanic membrane, ear canal and external ear normal.      Left Ear: Tympanic membrane, ear canal and external ear normal.      Nose: Nose normal.      Mouth/Throat:      Mouth: Mucous membranes are dry.      Pharynx: Oropharynx is clear.   Eyes:      Extraocular Movements: Extraocular movements intact.      Conjunctiva/sclera: Conjunctivae normal.      Pupils: Pupils are equal, round, and reactive to light.   Cardiovascular:      Rate and Rhythm: Normal rate and regular " rhythm.      Pulses: Normal pulses.      Heart sounds: Normal heart sounds.   Pulmonary:      Effort: Pulmonary effort is normal.      Breath sounds: Normal breath sounds.   Musculoskeletal:      Cervical back: Normal range of motion and neck supple.   Feet:      Comments:      Neurological:      Mental Status: He is alert.         Procedures      Assessment/Plan:   Diagnoses and all orders for this visit:    1. Erythrocytosis (Primary)  Assessment & Plan:  See urologist about decreasing testosterone.      2. Hypogonadotropic hypogonadism  Assessment & Plan:  Reviewed patient's Blood work.  He had an elevated hematocrit and hemoglobin.  They were greatly elevated but they are above normal.  Last we checked his testosterone it was over replaced.  I am going to have him see his urologist to see if they want to decrease the dose and monitor this.  Recheck in 6 months      3. Essential hypertension  Assessment & Plan:  Discussed with patient to monitor their blood pressure and if systolic blood pressure goes above 140 or diastolic is above 90 to return to clinic.  Take medicines as directed, call for any problems, patient not having or any worrisome symptoms.          4. Personal history of tobacco use, presenting hazards to health  Assessment & Plan:  Patient is wanting to use Chantix.  No contraindications.  Has tolerated well in the past.  Will call in a prescription.      Other orders  -     Varenicline Tartrate, Starter, 0.5 MG X 11 & 1 MG X 42 tablet therapy pack; Take 0.5 mg by mouth Daily for 3 days, THEN 0.5 mg 2 (Two) Times a Day for 4 days, THEN 1 mg 2 (Two) Times a Day for 21 days. Take 0.5 mg po daily x 3 days, then 0.5 mg po bid x 4 days, then 1 mg po bid  Dispense: 1 each; Refill: 0  -     varenicline (Chantix Continuing Month Brett) 1 MG tablet; Take 1 tablet by mouth 2 (Two) Times a Day for 56 days.  Dispense: 56 tablet; Refill: 1             Follow Up:   No follow-ups on file.    Masood Bran,  MD  [facility]

## 2024-01-08 NOTE — ASSESSMENT & PLAN NOTE
Patient is wanting to use Chantix.  No contraindications.  Has tolerated well in the past.  Will call in a prescription.

## 2024-01-08 NOTE — ASSESSMENT & PLAN NOTE
Reviewed patient's Blood work.  He had an elevated hematocrit and hemoglobin.  They were greatly elevated but they are above normal.  Last we checked his testosterone it was over replaced.  I am going to have him see his urologist to see if they want to decrease the dose and monitor this.  Recheck in 6 months

## 2024-02-16 DIAGNOSIS — I10 ESSENTIAL HYPERTENSION: ICD-10-CM

## 2024-02-16 DIAGNOSIS — F41.1 GENERALIZED ANXIETY DISORDER: ICD-10-CM

## 2024-02-16 RX ORDER — SERTRALINE HYDROCHLORIDE 100 MG/1
150 TABLET, FILM COATED ORAL DAILY
Qty: 135 TABLET | Refills: 0 | Status: SHIPPED | OUTPATIENT
Start: 2024-02-16

## 2024-02-16 RX ORDER — RAMIPRIL 10 MG/1
10 CAPSULE ORAL DAILY
Qty: 90 CAPSULE | Refills: 0 | Status: SHIPPED | OUTPATIENT
Start: 2024-02-16

## 2024-05-14 DIAGNOSIS — I10 ESSENTIAL HYPERTENSION: ICD-10-CM

## 2024-05-14 DIAGNOSIS — F41.1 GENERALIZED ANXIETY DISORDER: ICD-10-CM

## 2024-05-14 RX ORDER — SERTRALINE HYDROCHLORIDE 100 MG/1
150 TABLET, FILM COATED ORAL DAILY
Qty: 45 TABLET | Refills: 0 | Status: SHIPPED | OUTPATIENT
Start: 2024-05-14

## 2024-05-14 RX ORDER — RAMIPRIL 10 MG/1
10 CAPSULE ORAL DAILY
Qty: 30 CAPSULE | Refills: 0 | Status: SHIPPED | OUTPATIENT
Start: 2024-05-14

## 2024-06-11 DIAGNOSIS — F41.1 GENERALIZED ANXIETY DISORDER: ICD-10-CM

## 2024-06-11 DIAGNOSIS — I10 ESSENTIAL HYPERTENSION: ICD-10-CM

## 2024-06-11 RX ORDER — RAMIPRIL 10 MG/1
10 CAPSULE ORAL DAILY
Qty: 30 CAPSULE | Refills: 0 | Status: SHIPPED | OUTPATIENT
Start: 2024-06-11

## 2024-06-11 RX ORDER — SERTRALINE HYDROCHLORIDE 100 MG/1
150 TABLET, FILM COATED ORAL DAILY
Qty: 45 TABLET | Refills: 0 | Status: SHIPPED | OUTPATIENT
Start: 2024-06-11

## 2024-07-09 DIAGNOSIS — F41.1 GENERALIZED ANXIETY DISORDER: ICD-10-CM

## 2024-07-09 DIAGNOSIS — I10 ESSENTIAL HYPERTENSION: ICD-10-CM

## 2024-07-09 RX ORDER — SERTRALINE HYDROCHLORIDE 100 MG/1
150 TABLET, FILM COATED ORAL DAILY
Qty: 45 TABLET | Refills: 0 | Status: SHIPPED | OUTPATIENT
Start: 2024-07-09

## 2024-07-09 RX ORDER — RAMIPRIL 10 MG/1
10 CAPSULE ORAL DAILY
Qty: 30 CAPSULE | Refills: 0 | Status: SHIPPED | OUTPATIENT
Start: 2024-07-09

## 2024-08-15 DIAGNOSIS — F41.1 GENERALIZED ANXIETY DISORDER: ICD-10-CM

## 2024-08-15 DIAGNOSIS — I10 ESSENTIAL HYPERTENSION: ICD-10-CM

## 2024-08-15 RX ORDER — SERTRALINE HYDROCHLORIDE 100 MG/1
150 TABLET, FILM COATED ORAL DAILY
Qty: 45 TABLET | Refills: 0 | Status: SHIPPED | OUTPATIENT
Start: 2024-08-15

## 2024-08-15 RX ORDER — RAMIPRIL 10 MG/1
10 CAPSULE ORAL DAILY
Qty: 30 CAPSULE | Refills: 0 | Status: SHIPPED | OUTPATIENT
Start: 2024-08-15

## 2024-09-18 DIAGNOSIS — I10 ESSENTIAL HYPERTENSION: ICD-10-CM

## 2024-09-18 RX ORDER — RAMIPRIL 10 MG/1
10 CAPSULE ORAL DAILY
Qty: 30 CAPSULE | Refills: 0 | Status: SHIPPED | OUTPATIENT
Start: 2024-09-18

## 2024-09-24 DIAGNOSIS — F41.1 GENERALIZED ANXIETY DISORDER: ICD-10-CM

## 2024-09-24 RX ORDER — SERTRALINE HYDROCHLORIDE 100 MG/1
150 TABLET, FILM COATED ORAL DAILY
Qty: 45 TABLET | Refills: 0 | Status: SHIPPED | OUTPATIENT
Start: 2024-09-24

## 2024-10-29 DIAGNOSIS — I10 ESSENTIAL HYPERTENSION: ICD-10-CM

## 2024-10-29 DIAGNOSIS — F41.1 GENERALIZED ANXIETY DISORDER: ICD-10-CM

## 2024-10-29 RX ORDER — SERTRALINE HYDROCHLORIDE 100 MG/1
150 TABLET, FILM COATED ORAL DAILY
Qty: 45 TABLET | Refills: 0 | Status: SHIPPED | OUTPATIENT
Start: 2024-10-29

## 2024-10-29 RX ORDER — RAMIPRIL 10 MG/1
10 CAPSULE ORAL DAILY
Qty: 30 CAPSULE | Refills: 0 | Status: SHIPPED | OUTPATIENT
Start: 2024-10-29

## 2024-11-26 ENCOUNTER — TELEPHONE (OUTPATIENT)
Dept: FAMILY MEDICINE CLINIC | Facility: CLINIC | Age: 54
End: 2024-11-26
Payer: COMMERCIAL

## 2024-11-26 DIAGNOSIS — I10 ESSENTIAL HYPERTENSION: ICD-10-CM

## 2024-11-26 DIAGNOSIS — F41.1 GENERALIZED ANXIETY DISORDER: ICD-10-CM

## 2024-11-26 RX ORDER — SERTRALINE HYDROCHLORIDE 100 MG/1
TABLET, FILM COATED ORAL
Qty: 45 TABLET | Refills: 0 | Status: SHIPPED | OUTPATIENT
Start: 2024-11-26

## 2024-11-26 RX ORDER — RAMIPRIL 10 MG/1
10 CAPSULE ORAL DAILY
Qty: 30 CAPSULE | Refills: 0 | Status: SHIPPED | OUTPATIENT
Start: 2024-11-26

## 2024-11-26 NOTE — TELEPHONE ENCOUNTER
30 days of patients medication has been sent into pharmacy. Patient will need appointment for more refills.

## 2024-12-26 DIAGNOSIS — I10 ESSENTIAL HYPERTENSION: ICD-10-CM

## 2024-12-26 RX ORDER — RAMIPRIL 10 MG/1
10 CAPSULE ORAL DAILY
Qty: 60 CAPSULE | Refills: 0 | Status: SHIPPED | OUTPATIENT
Start: 2024-12-26

## 2024-12-28 DIAGNOSIS — F41.1 GENERALIZED ANXIETY DISORDER: ICD-10-CM

## 2024-12-28 RX ORDER — SERTRALINE HYDROCHLORIDE 100 MG/1
TABLET, FILM COATED ORAL
Qty: 45 TABLET | Refills: 0 | Status: SHIPPED | OUTPATIENT
Start: 2024-12-28

## 2024-12-30 NOTE — PROGRESS NOTES
"       Patient Name: Harvey Wu  : 1970   MRN: [id number]     Chief Complaint:    Chief Complaint   Patient presents with    Primary Care Follow-Up       History of Present Illness: Harvey Wu is a 54 y.o. male who is here today for follow up on blood pressure and thyroid  HPI        Review of Systems:   Review of Systems   Constitutional: Negative.    HENT: Negative.     Eyes: Negative.    Respiratory: Negative.     Cardiovascular: Negative.    Gastrointestinal: Negative.    Neurological: Negative.         Past Medical History:   Past Medical History:   Diagnosis Date    Boxer's fracture     right hand - did not heal right with casting so had surgery to repair    Generalized anxiety disorder     Hypertension     Low testosterone     testosterone shots       Past Surgical History:   Past Surgical History:   Procedure Laterality Date    FRACTURE SURGERY      HAND SURGERY      Boxer's fracture repair    INGUINAL HERNIA REPAIR Right 1972       Family History:   Family History   Problem Relation Age of Onset    Pneumonia Father         cause of death    Other Father         heavy smoker    Cancer Brother 50        throat cancer       Social History:   Social History     Socioeconomic History    Marital status:    Tobacco Use    Smoking status: Every Day     Current packs/day: 0.00     Average packs/day: 0.5 packs/day for 34.0 years (17.0 ttl pk-yrs)     Types: Cigarettes     Start date: 1986     Last attempt to quit: 2020     Years since quittin.0     Passive exposure: Current    Smokeless tobacco: Never   Vaping Use    Vaping status: Never Used   Substance and Sexual Activity    Alcohol use: Yes     Alcohol/week: 21.0 standard drinks of alcohol     Types: 21 Cans of beer per week    Drug use: Never    Sexual activity: Yes     Partners: Female       Medications:     Current Outpatient Medications:     BD Hypodermic Needle 21G X 2\" misc, USE WEEKLY TO INJECT INSULIN, Disp: , Rfl:     " "omeprazole (priLOSEC) 40 MG capsule, Take 1 capsule by mouth Daily., Disp: 90 capsule, Rfl: 1    ramipril (ALTACE) 10 MG capsule, Take 1 capsule by mouth Daily., Disp: 90 capsule, Rfl: 1    sertraline (ZOLOFT) 100 MG tablet, Take 1 tablet by mouth Daily., Disp: 90 tablet, Rfl: 1    Testosterone Cypionate (DEPOTESTOTERONE CYPIONATE) 200 MG/ML injection, Depo-Testosterone 200 mg/mL intramuscular oil  1 cc ( 200 mg) IM Every week, Disp: , Rfl:     amLODIPine (NORVASC) 5 MG tablet, Take 1 tablet by mouth Daily., Disp: 90 tablet, Rfl: 1    Allergies:   Allergies   Allergen Reactions    Codeine Itching         Physical Exam:  Vital Signs:   Vitals:    12/31/24 1204   BP: 153/100   BP Location: Left arm   Patient Position: Sitting   Cuff Size: Adult   Pulse: 75   Resp: 16   SpO2: 98%   Weight: 76.6 kg (168 lb 12.8 oz)   Height: 180.3 cm (70.98\")     Body mass index is 23.55 kg/m².     Physical Exam  Vitals and nursing note reviewed.   Constitutional:       Appearance: Normal appearance. He is normal weight.   HENT:      Head: Normocephalic and atraumatic.      Right Ear: Tympanic membrane, ear canal and external ear normal.      Left Ear: Tympanic membrane, ear canal and external ear normal.      Nose: Nose normal.      Mouth/Throat:      Mouth: Mucous membranes are dry.      Pharynx: Oropharynx is clear.   Eyes:      Extraocular Movements: Extraocular movements intact.      Conjunctiva/sclera: Conjunctivae normal.      Pupils: Pupils are equal, round, and reactive to light.   Cardiovascular:      Rate and Rhythm: Normal rate and regular rhythm.      Pulses: Normal pulses.      Heart sounds: Normal heart sounds.   Pulmonary:      Effort: Pulmonary effort is normal.      Breath sounds: Normal breath sounds.   Musculoskeletal:      Cervical back: Normal range of motion and neck supple.   Feet:      Comments:      Neurological:      Mental Status: He is alert.         Procedures      Assessment/Plan:   Diagnoses and all " orders for this visit:    1. Immunization due (Primary)  -     Fluzone >6mos    2. Essential hypertension  Assessment & Plan:  Discussed with patient to monitor their blood pressure and if systolic blood pressure goes above 140 or diastolic is above 90 to return to clinic.  Take medicines as directed, call for any problems, patient not having or any worrisome symptoms.    We are going to add amlodipine to his regimen.    Orders:  -     ramipril (ALTACE) 10 MG capsule; Take 1 capsule by mouth Daily.  Dispense: 90 capsule; Refill: 1    3. Acquired hypothyroidism  Assessment & Plan:  TSH 1.590.  We will follow.      4. Hypogonadotropic hypogonadism  Assessment & Plan:  Patient has been followed by his urologist.  They are monitoring his Blood work closely.      5. Erythrocytosis  Assessment & Plan:  Followed by urologist.      6. GERD without esophagitis  Assessment & Plan:  Continue meds    Orders:  -     omeprazole (priLOSEC) 40 MG capsule; Take 1 capsule by mouth Daily.  Dispense: 90 capsule; Refill: 1    7. Generalized anxiety disorder  Assessment & Plan:  Continue sertraline.    Orders:  -     sertraline (ZOLOFT) 100 MG tablet; Take 1 tablet by mouth Daily.  Dispense: 90 tablet; Refill: 1    Other orders  -     amLODIPine (NORVASC) 5 MG tablet; Take 1 tablet by mouth Daily.  Dispense: 90 tablet; Refill: 1             Follow Up:   No follow-ups on file.      Masood Bran MD  [facility] [place] [facility]

## 2024-12-30 NOTE — ASSESSMENT & PLAN NOTE
Discussed with patient to monitor their blood pressure and if systolic blood pressure goes above 140 or diastolic is above 90 to return to clinic.  Take medicines as directed, call for any problems, patient not having or any worrisome symptoms.    We are going to add amlodipine to his regimen.

## 2024-12-31 ENCOUNTER — OFFICE VISIT (OUTPATIENT)
Dept: FAMILY MEDICINE CLINIC | Facility: CLINIC | Age: 54
End: 2024-12-31
Payer: COMMERCIAL

## 2024-12-31 VITALS
RESPIRATION RATE: 16 BRPM | HEART RATE: 75 BPM | BODY MASS INDEX: 23.63 KG/M2 | HEIGHT: 71 IN | OXYGEN SATURATION: 98 % | WEIGHT: 168.8 LBS | SYSTOLIC BLOOD PRESSURE: 153 MMHG | DIASTOLIC BLOOD PRESSURE: 100 MMHG

## 2024-12-31 DIAGNOSIS — Z23 IMMUNIZATION DUE: Primary | ICD-10-CM

## 2024-12-31 DIAGNOSIS — D75.1 ERYTHROCYTOSIS: ICD-10-CM

## 2024-12-31 DIAGNOSIS — F41.1 GENERALIZED ANXIETY DISORDER: ICD-10-CM

## 2024-12-31 DIAGNOSIS — E03.9 ACQUIRED HYPOTHYROIDISM: ICD-10-CM

## 2024-12-31 DIAGNOSIS — I10 ESSENTIAL HYPERTENSION: ICD-10-CM

## 2024-12-31 DIAGNOSIS — E23.0 HYPOGONADOTROPIC HYPOGONADISM: ICD-10-CM

## 2024-12-31 DIAGNOSIS — K21.9 GERD WITHOUT ESOPHAGITIS: ICD-10-CM

## 2024-12-31 PROCEDURE — 90471 IMMUNIZATION ADMIN: CPT | Performed by: FAMILY MEDICINE

## 2024-12-31 PROCEDURE — 90656 IIV3 VACC NO PRSV 0.5 ML IM: CPT | Performed by: FAMILY MEDICINE

## 2024-12-31 PROCEDURE — 99214 OFFICE O/P EST MOD 30 MIN: CPT | Performed by: FAMILY MEDICINE

## 2024-12-31 RX ORDER — RAMIPRIL 10 MG/1
10 CAPSULE ORAL DAILY
Qty: 90 CAPSULE | Refills: 1 | Status: SHIPPED | OUTPATIENT
Start: 2024-12-31

## 2024-12-31 RX ORDER — AMLODIPINE BESYLATE 5 MG/1
5 TABLET ORAL DAILY
Qty: 90 TABLET | Refills: 1 | Status: SHIPPED | OUTPATIENT
Start: 2024-12-31

## 2024-12-31 RX ORDER — SERTRALINE HYDROCHLORIDE 100 MG/1
100 TABLET, FILM COATED ORAL DAILY
Qty: 90 TABLET | Refills: 1 | Status: SHIPPED | OUTPATIENT
Start: 2024-12-31

## 2024-12-31 RX ORDER — OMEPRAZOLE 40 MG/1
40 CAPSULE, DELAYED RELEASE ORAL DAILY
Qty: 90 CAPSULE | Refills: 1 | Status: SHIPPED | OUTPATIENT
Start: 2024-12-31

## 2025-01-25 DIAGNOSIS — F41.1 GENERALIZED ANXIETY DISORDER: ICD-10-CM

## 2025-01-27 RX ORDER — SERTRALINE HYDROCHLORIDE 100 MG/1
TABLET, FILM COATED ORAL
Qty: 45 TABLET | OUTPATIENT
Start: 2025-01-27

## 2025-06-27 NOTE — PROGRESS NOTES
"       Patient Name: Harvey Wu  : 1970   MRN: [id number]     Chief Complaint:    Chief Complaint   Patient presents with    Annual Exam       History of Present Illness: Harvey Wu is a 54 y.o. male who is here today for their annual health maintenance and physical.  And go over blood pressure, thyroid,         Review of Systems:   Review of Systems   Constitutional: Negative.    HENT: Negative.     Eyes: Negative.    Respiratory: Negative.     Cardiovascular: Negative.    Gastrointestinal: Negative.    Neurological: Negative.        Past Medical History, Social History, Family History and Care Team were all reviewed with patient and updated as appropriate.     Medications:     Current Outpatient Medications:     amLODIPine (NORVASC) 5 MG tablet, Take 1 tablet by mouth Daily., Disp: 90 tablet, Rfl: 1    BD Hypodermic Needle 21G X 2\" misc, USE WEEKLY TO INJECT INSULIN, Disp: , Rfl:     omeprazole (priLOSEC) 40 MG capsule, Take 1 capsule by mouth Daily., Disp: 90 capsule, Rfl: 1    ramipril (ALTACE) 10 MG capsule, Take 1 capsule by mouth Daily., Disp: 90 capsule, Rfl: 1    sertraline (ZOLOFT) 100 MG tablet, Take 1 tablet by mouth Daily., Disp: 90 tablet, Rfl: 1    Testosterone Cypionate (DEPOTESTOTERONE CYPIONATE) 200 MG/ML injection, Depo-Testosterone 200 mg/mL intramuscular oil  1 cc ( 200 mg) IM Every week, Disp: , Rfl:     [START ON 2025] varenicline (Chantix Continuing Month Brett) 1 MG tablet, Take 1 tablet by mouth 2 (Two) Times a Day for 56 days., Disp: 56 tablet, Rfl: 1    Varenicline Tartrate, Starter, 0.5 MG X 11 & 1 MG X 42 tablet therapy pack, Take 0.5 mg by mouth Daily for 3 days, THEN 0.5 mg 2 (Two) Times a Day for 4 days, THEN 1 mg 2 (Two) Times a Day for 21 days. Take 0.5 mg po daily x 3 days, then 0.5 mg po bid x 4 days, then 1 mg po bid, Disp: 1 each, Rfl: 0    Allergies:   Allergies   Allergen Reactions    Codeine Itching       Health Maintenance   Topic Date Due    " "Pneumococcal Vaccine 50+ (1 of 2 - PCV) Never done    ZOSTER VACCINE (1 of 2) Never done    COVID-19 Vaccine (4 - 2024-25 season) 09/01/2024    INFLUENZA VACCINE  07/01/2025    ANNUAL PHYSICAL  06/30/2026    COLORECTAL CANCER SCREENING  01/27/2029    TDAP/TD VACCINES (2 - Td or Tdap) 05/25/2032    HEPATITIS C SCREENING  Completed        PHQ-2 Total Score:         Intimate partner violence: (Screen on initial visit, older adult with injury or evidence of neglect):  Violence can be a problem in many people's lives, so I now ask every patient about trauma or abuse they may have experienced in a relationship.  Stress/Safety - Do you feel safe in your relationship?  Afraid/Abused - Have you ever been in a relationship where you were threatened, hurt, or afraid?  Friend/Family - Are your friends aware you have been hurt?  Emergency Plan - Do you have a safe place to go and the resources you need in an emergency?    Osteoporosis:   Men: history of low trauma fracture, androgen deprivation therapy for prostate cancer, hypogonadism, primary hyperparathyroidism, intestinal disorders.       Physical Exam:  Vital Signs:   Vitals:    06/30/25 1142   BP: 130/82   BP Location: Left arm   Patient Position: Sitting   Cuff Size: Adult   Pulse: 85   Resp: 16   SpO2: 98%   Weight: 77.2 kg (170 lb 1.6 oz)   Height: 180.3 cm (70.98\")   PainSc: 0-No pain     Body mass index is 23.73 kg/m².     Physical Exam  Vitals and nursing note reviewed.   Constitutional:       Appearance: Normal appearance. He is normal weight.   HENT:      Head: Normocephalic and atraumatic.      Right Ear: Tympanic membrane, ear canal and external ear normal.      Left Ear: Tympanic membrane, ear canal and external ear normal.      Nose: Nose normal.      Mouth/Throat:      Mouth: Mucous membranes are moist.      Pharynx: Oropharynx is clear.   Eyes:      Extraocular Movements: Extraocular movements intact.      Conjunctiva/sclera: Conjunctivae normal.      Pupils: " Pupils are equal, round, and reactive to light.   Cardiovascular:      Rate and Rhythm: Normal rate and regular rhythm.      Pulses: Normal pulses.      Heart sounds: Normal heart sounds.   Pulmonary:      Effort: Pulmonary effort is normal.      Breath sounds: Normal breath sounds.   Abdominal:      General: Abdomen is flat. Bowel sounds are normal.      Palpations: Abdomen is soft.   Musculoskeletal:         General: Normal range of motion.      Cervical back: Normal range of motion and neck supple.   Feet:      Comments:      Skin:     General: Skin is warm and dry.   Neurological:      General: No focal deficit present.      Mental Status: He is alert and oriented to person, place, and time.   Psychiatric:         Mood and Affect: Mood normal.         Behavior: Behavior normal.         Thought Content: Thought content normal.         Judgment: Judgment normal.         Procedures      Assessment/Plan:   Diagnoses and all orders for this visit:    1. Physical exam (Primary)  Assessment & Plan:  We discussed health maintenance, diet, exercise, and immunization    Orders:  -     Comprehensive Metabolic Panel; Future  -     Hemoglobin A1c; Future  -     Lipid Panel; Future  -     CBC & Differential; Future  -     Vitamin B12; Future  -     TSH Rfx On Abnormal To Free T4; Future  -     Vitamin D,25-Hydroxy; Future  -     PSA DIAGNOSTIC; Future    2. Essential hypertension  Assessment & Plan:  Discussed with patient to monitor their blood pressure and if systolic blood pressure goes above 140 or diastolic is above 90 to return to clinic.  Take medicines as directed, call for any problems, patient not having or any worrisome symptoms.        Orders:  -     Comprehensive Metabolic Panel; Future  -     Hemoglobin A1c; Future  -     Lipid Panel; Future  -     CBC & Differential; Future  -     Vitamin B12; Future  -     TSH Rfx On Abnormal To Free T4; Future  -     Vitamin D,25-Hydroxy; Future  -     PSA DIAGNOSTIC;  Future    3. Acquired hypothyroidism  Assessment & Plan:  Blood work    Orders:  -     Comprehensive Metabolic Panel; Future  -     Hemoglobin A1c; Future  -     Lipid Panel; Future  -     CBC & Differential; Future  -     Vitamin B12; Future  -     TSH Rfx On Abnormal To Free T4; Future  -     Vitamin D,25-Hydroxy; Future  -     PSA DIAGNOSTIC; Future    4. Personal history of tobacco use, presenting hazards to health  Assessment & Plan:  Pt has about 30 pack years we are to get a low-dose CAT scan.  I gave him some Chantix.  He has had this before and has no contraindications.    Orders:  -     Comprehensive Metabolic Panel; Future  -     Hemoglobin A1c; Future  -     Lipid Panel; Future  -     CBC & Differential; Future  -     Vitamin B12; Future  -     TSH Rfx On Abnormal To Free T4; Future  -     Vitamin D,25-Hydroxy; Future  -     PSA DIAGNOSTIC; Future  -      CT Chest Low Dose Cancer Screening WO; Future    5. Erythrocytosis  -     Comprehensive Metabolic Panel; Future  -     Hemoglobin A1c; Future  -     Lipid Panel; Future  -     CBC & Differential; Future  -     Vitamin B12; Future  -     TSH Rfx On Abnormal To Free T4; Future  -     Vitamin D,25-Hydroxy; Future  -     PSA DIAGNOSTIC; Future    6. Hypogonadotropic hypogonadism  Assessment & Plan:  Patient has been followed by his urologist.  They are monitoring his Blood work closely.    Orders:  -     Comprehensive Metabolic Panel; Future  -     Hemoglobin A1c; Future  -     Lipid Panel; Future  -     CBC & Differential; Future  -     Vitamin B12; Future  -     TSH Rfx On Abnormal To Free T4; Future  -     Vitamin D,25-Hydroxy; Future  -     PSA DIAGNOSTIC; Future    Other orders  -     Varenicline Tartrate, Starter, 0.5 MG X 11 & 1 MG X 42 tablet therapy pack; Take 0.5 mg by mouth Daily for 3 days, THEN 0.5 mg 2 (Two) Times a Day for 4 days, THEN 1 mg 2 (Two) Times a Day for 21 days. Take 0.5 mg po daily x 3 days, then 0.5 mg po bid x 4 days, then 1 mg  po bid  Dispense: 1 each; Refill: 0  -     varenicline (Chantix Continuing Month Brett) 1 MG tablet; Take 1 tablet by mouth 2 (Two) Times a Day for 56 days.  Dispense: 56 tablet; Refill: 1               Follow Up:   No follow-ups on file.    Healthcare Maintenance:   Counseling provided on health maintenance and immunizations.  Harvey Wu voices understanding and acceptance of this advice and will call back with any further questions or concerns. AVS with preventive healthcare tips printed for patient.     Masood Bran MD  [facility] [place] [facility] [place]

## 2025-06-30 ENCOUNTER — OFFICE VISIT (OUTPATIENT)
Dept: FAMILY MEDICINE CLINIC | Facility: CLINIC | Age: 55
End: 2025-06-30
Payer: COMMERCIAL

## 2025-06-30 ENCOUNTER — TELEPHONE (OUTPATIENT)
Dept: FAMILY MEDICINE CLINIC | Facility: CLINIC | Age: 55
End: 2025-06-30

## 2025-06-30 VITALS
DIASTOLIC BLOOD PRESSURE: 82 MMHG | RESPIRATION RATE: 16 BRPM | WEIGHT: 170.1 LBS | HEIGHT: 71 IN | BODY MASS INDEX: 23.81 KG/M2 | HEART RATE: 85 BPM | SYSTOLIC BLOOD PRESSURE: 130 MMHG | OXYGEN SATURATION: 98 %

## 2025-06-30 DIAGNOSIS — D75.1 ERYTHROCYTOSIS: ICD-10-CM

## 2025-06-30 DIAGNOSIS — E23.0 HYPOGONADOTROPIC HYPOGONADISM: ICD-10-CM

## 2025-06-30 DIAGNOSIS — I10 ESSENTIAL HYPERTENSION: ICD-10-CM

## 2025-06-30 DIAGNOSIS — Z87.891 PERSONAL HISTORY OF TOBACCO USE, PRESENTING HAZARDS TO HEALTH: ICD-10-CM

## 2025-06-30 DIAGNOSIS — Z00.00 PHYSICAL EXAM: Primary | ICD-10-CM

## 2025-06-30 DIAGNOSIS — E03.9 ACQUIRED HYPOTHYROIDISM: ICD-10-CM

## 2025-06-30 PROCEDURE — 99396 PREV VISIT EST AGE 40-64: CPT | Performed by: FAMILY MEDICINE

## 2025-06-30 PROCEDURE — 99214 OFFICE O/P EST MOD 30 MIN: CPT | Performed by: FAMILY MEDICINE

## 2025-06-30 RX ORDER — VARENICLINE TARTRATE 0.5 (11)-1
KIT ORAL
Qty: 1 EACH | Refills: 0 | Status: SHIPPED | OUTPATIENT
Start: 2025-06-30 | End: 2025-07-28

## 2025-06-30 RX ORDER — VARENICLINE TARTRATE 1 MG/1
1 TABLET, FILM COATED ORAL 2 TIMES DAILY
Qty: 56 TABLET | Refills: 1 | Status: SHIPPED | OUTPATIENT
Start: 2025-07-28 | End: 2025-09-22

## 2025-06-30 NOTE — ASSESSMENT & PLAN NOTE
Pt has about 30 pack years we are to get a low-dose CAT scan.  I gave him some Chantix.  He has had this before and has no contraindications.

## 2025-07-01 ENCOUNTER — LAB (OUTPATIENT)
Dept: FAMILY MEDICINE CLINIC | Facility: CLINIC | Age: 55
End: 2025-07-01
Payer: COMMERCIAL

## 2025-07-01 DIAGNOSIS — E03.9 ACQUIRED HYPOTHYROIDISM: ICD-10-CM

## 2025-07-01 DIAGNOSIS — Z00.00 PHYSICAL EXAM: ICD-10-CM

## 2025-07-01 DIAGNOSIS — Z87.891 PERSONAL HISTORY OF TOBACCO USE, PRESENTING HAZARDS TO HEALTH: ICD-10-CM

## 2025-07-01 DIAGNOSIS — D75.1 ERYTHROCYTOSIS: ICD-10-CM

## 2025-07-01 DIAGNOSIS — E23.0 HYPOGONADOTROPIC HYPOGONADISM: ICD-10-CM

## 2025-07-01 DIAGNOSIS — I10 ESSENTIAL HYPERTENSION: ICD-10-CM

## 2025-07-02 LAB
25(OH)D3+25(OH)D2 SERPL-MCNC: 35.9 NG/ML (ref 30–100)
ALBUMIN SERPL-MCNC: 4.7 G/DL (ref 3.8–4.9)
ALP SERPL-CCNC: 44 IU/L (ref 44–121)
ALT SERPL-CCNC: 17 IU/L (ref 0–44)
AST SERPL-CCNC: 29 IU/L (ref 0–40)
BASOPHILS # BLD AUTO: 0.1 X10E3/UL (ref 0–0.2)
BASOPHILS NFR BLD AUTO: 1 %
BILIRUB SERPL-MCNC: 0.5 MG/DL (ref 0–1.2)
BUN SERPL-MCNC: 9 MG/DL (ref 6–24)
BUN/CREAT SERPL: 9 (ref 9–20)
CALCIUM SERPL-MCNC: 9.6 MG/DL (ref 8.7–10.2)
CHLORIDE SERPL-SCNC: 100 MMOL/L (ref 96–106)
CHOLEST SERPL-MCNC: 198 MG/DL (ref 100–199)
CO2 SERPL-SCNC: 20 MMOL/L (ref 20–29)
CREAT SERPL-MCNC: 0.98 MG/DL (ref 0.76–1.27)
EGFRCR SERPLBLD CKD-EPI 2021: 91 ML/MIN/1.73
EOSINOPHIL # BLD AUTO: 0.2 X10E3/UL (ref 0–0.4)
EOSINOPHIL NFR BLD AUTO: 2 %
ERYTHROCYTE [DISTWIDTH] IN BLOOD BY AUTOMATED COUNT: 12.7 % (ref 11.6–15.4)
GLOBULIN SER CALC-MCNC: 2.3 G/DL (ref 1.5–4.5)
GLUCOSE SERPL-MCNC: 108 MG/DL (ref 70–99)
HBA1C MFR BLD: 5.2 % (ref 4.8–5.6)
HCT VFR BLD AUTO: 50.9 % (ref 37.5–51)
HDLC SERPL-MCNC: 102 MG/DL
HGB BLD-MCNC: 17.1 G/DL (ref 13–17.7)
IMM GRANULOCYTES # BLD AUTO: 0 X10E3/UL (ref 0–0.1)
IMM GRANULOCYTES NFR BLD AUTO: 0 %
LDLC SERPL CALC-MCNC: 87 MG/DL (ref 0–99)
LYMPHOCYTES # BLD AUTO: 1.5 X10E3/UL (ref 0.7–3.1)
LYMPHOCYTES NFR BLD AUTO: 18 %
MCH RBC QN AUTO: 33.5 PG (ref 26.6–33)
MCHC RBC AUTO-ENTMCNC: 33.6 G/DL (ref 31.5–35.7)
MCV RBC AUTO: 100 FL (ref 79–97)
MONOCYTES # BLD AUTO: 1 X10E3/UL (ref 0.1–0.9)
MONOCYTES NFR BLD AUTO: 11 %
NEUTROPHILS # BLD AUTO: 5.6 X10E3/UL (ref 1.4–7)
NEUTROPHILS NFR BLD AUTO: 68 %
PLATELET # BLD AUTO: 312 X10E3/UL (ref 150–450)
POTASSIUM SERPL-SCNC: 4.5 MMOL/L (ref 3.5–5.2)
PROT SERPL-MCNC: 7 G/DL (ref 6–8.5)
PSA SERPL-MCNC: 1 NG/ML (ref 0–4)
RBC # BLD AUTO: 5.1 X10E6/UL (ref 4.14–5.8)
SODIUM SERPL-SCNC: 138 MMOL/L (ref 134–144)
TRIGL SERPL-MCNC: 49 MG/DL (ref 0–149)
TSH SERPL DL<=0.005 MIU/L-ACNC: 1.97 UIU/ML (ref 0.45–4.5)
VIT B12 SERPL-MCNC: 269 PG/ML (ref 232–1245)
VLDLC SERPL CALC-MCNC: 9 MG/DL (ref 5–40)
WBC # BLD AUTO: 8.4 X10E3/UL (ref 3.4–10.8)

## 2025-07-14 DIAGNOSIS — K21.9 GERD WITHOUT ESOPHAGITIS: ICD-10-CM

## 2025-07-14 DIAGNOSIS — F41.1 GENERALIZED ANXIETY DISORDER: ICD-10-CM

## 2025-07-14 RX ORDER — AMLODIPINE BESYLATE 5 MG/1
5 TABLET ORAL DAILY
Qty: 90 TABLET | Refills: 0 | Status: SHIPPED | OUTPATIENT
Start: 2025-07-14

## 2025-07-14 RX ORDER — OMEPRAZOLE 40 MG/1
40 CAPSULE, DELAYED RELEASE ORAL DAILY
Qty: 90 CAPSULE | Refills: 0 | Status: SHIPPED | OUTPATIENT
Start: 2025-07-14

## 2025-07-14 RX ORDER — SERTRALINE HYDROCHLORIDE 100 MG/1
100 TABLET, FILM COATED ORAL DAILY
Qty: 90 TABLET | Refills: 0 | Status: SHIPPED | OUTPATIENT
Start: 2025-07-14

## 2025-07-19 DIAGNOSIS — I10 ESSENTIAL HYPERTENSION: ICD-10-CM

## 2025-07-21 RX ORDER — RAMIPRIL 10 MG/1
10 CAPSULE ORAL DAILY
Qty: 90 CAPSULE | Refills: 0 | Status: SHIPPED | OUTPATIENT
Start: 2025-07-21